# Patient Record
Sex: MALE | Race: OTHER | URBAN - METROPOLITAN AREA
[De-identification: names, ages, dates, MRNs, and addresses within clinical notes are randomized per-mention and may not be internally consistent; named-entity substitution may affect disease eponyms.]

---

## 2020-12-14 ENCOUNTER — IMPORTED ENCOUNTER (OUTPATIENT)
Dept: URBAN - METROPOLITAN AREA CLINIC 50 | Facility: CLINIC | Age: 70
End: 2020-12-14

## 2020-12-14 NOTE — PATIENT DISCUSSION
"""Discussed with patient possiblitly of LASIK procedure to correct near sightedness after cataract ""

## 2021-04-17 ASSESSMENT — VISUAL ACUITY
OS_SC: 20/50
OD_CC: J1+
OD_SC: 20/30
OS_PH: 20/40
OD_PH: 20/20
OS_CC: J1+

## 2021-04-17 ASSESSMENT — TONOMETRY
OD_IOP_MMHG: 17
OS_IOP_MMHG: 16

## 2021-12-09 ENCOUNTER — PREPPED CHART (OUTPATIENT)
Dept: URBAN - METROPOLITAN AREA CLINIC 53 | Facility: CLINIC | Age: 71
End: 2021-12-09

## 2021-12-23 ENCOUNTER — COMPREHENSIVE EXAM (OUTPATIENT)
Dept: URBAN - METROPOLITAN AREA CLINIC 50 | Facility: CLINIC | Age: 71
End: 2021-12-23

## 2021-12-23 DIAGNOSIS — H35.373: ICD-10-CM

## 2021-12-23 DIAGNOSIS — H52.4: ICD-10-CM

## 2021-12-23 DIAGNOSIS — H43.813: ICD-10-CM

## 2021-12-23 DIAGNOSIS — H16.223: ICD-10-CM

## 2021-12-23 PROCEDURE — 92015 DETERMINE REFRACTIVE STATE: CPT

## 2021-12-23 PROCEDURE — 92014 COMPRE OPH EXAM EST PT 1/>: CPT

## 2021-12-23 PROCEDURE — 92134 CPTRZ OPH DX IMG PST SGM RTA: CPT

## 2021-12-23 ASSESSMENT — VISUAL ACUITY
OD_CC: 20/25
OS_SC: 20/70
OS_CC: 20/40
OU_SC: J1+
OD_SC: 20/40
OU_SC: 20/40
OU_CC: 20/25

## 2021-12-23 ASSESSMENT — TONOMETRY
OS_IOP_MMHG: 16
OD_IOP_MMHG: 16

## 2023-05-11 ENCOUNTER — COMPREHENSIVE EXAM (OUTPATIENT)
Dept: URBAN - METROPOLITAN AREA CLINIC 48 | Facility: LOCATION | Age: 73
End: 2023-05-11

## 2023-05-11 DIAGNOSIS — H52.4: ICD-10-CM

## 2023-05-11 DIAGNOSIS — H04.123: ICD-10-CM

## 2023-05-11 DIAGNOSIS — H35.373: ICD-10-CM

## 2023-05-11 PROCEDURE — 92015 DETERMINE REFRACTIVE STATE: CPT

## 2023-05-11 PROCEDURE — 92014 COMPRE OPH EXAM EST PT 1/>: CPT

## 2023-05-11 PROCEDURE — 92134 CPTRZ OPH DX IMG PST SGM RTA: CPT

## 2023-05-11 ASSESSMENT — TONOMETRY
OD_IOP_MMHG: 17
OS_IOP_MMHG: 16

## 2023-05-11 ASSESSMENT — VISUAL ACUITY
OS_SC: 20/60-1
OD_SC: 20/40-2
OS_PH: 20/40

## 2024-07-16 ENCOUNTER — CONSULTATION/EVALUATION (OUTPATIENT)
Dept: URBAN - METROPOLITAN AREA CLINIC 53 | Facility: CLINIC | Age: 74
End: 2024-07-16

## 2024-07-16 DIAGNOSIS — T15.01XA: ICD-10-CM

## 2024-07-16 PROCEDURE — 99213 OFFICE O/P EST LOW 20 MIN: CPT | Mod: 25

## 2024-07-16 PROCEDURE — 65210 REMOVE FOREIGN BODY FROM EYE: CPT

## 2024-07-16 ASSESSMENT — VISUAL ACUITY
OD_PH: 20/25-2
OD_CC: 20/30
OS_CC: 20/25
OU_CC: 20/20-1

## 2024-07-16 ASSESSMENT — TONOMETRY
OD_IOP_MMHG: 17
OS_IOP_MMHG: 15

## 2024-07-19 ENCOUNTER — COMPREHENSIVE EXAM (OUTPATIENT)
Dept: URBAN - METROPOLITAN AREA CLINIC 50 | Facility: LOCATION | Age: 74
End: 2024-07-19

## 2024-07-19 DIAGNOSIS — H52.4: ICD-10-CM

## 2024-07-19 DIAGNOSIS — H43.813: ICD-10-CM

## 2024-07-19 DIAGNOSIS — H35.373: ICD-10-CM

## 2024-07-19 DIAGNOSIS — H04.123: ICD-10-CM

## 2024-07-19 PROCEDURE — 92134 CPTRZ OPH DX IMG PST SGM RTA: CPT

## 2024-07-19 PROCEDURE — 92015 DETERMINE REFRACTIVE STATE: CPT

## 2024-07-19 PROCEDURE — 99214 OFFICE O/P EST MOD 30 MIN: CPT

## 2024-07-19 ASSESSMENT — TONOMETRY
OD_IOP_MMHG: 14
OS_IOP_MMHG: 15

## 2024-07-19 ASSESSMENT — VISUAL ACUITY
OS_CC: 20/30
OS_PH: 20/25
OD_PH: 20/20-2
OD_CC: 20/30
OU_SC: J1+@16"